# Patient Record
Sex: MALE | Race: WHITE | NOT HISPANIC OR LATINO | Employment: UNEMPLOYED | URBAN - METROPOLITAN AREA
[De-identification: names, ages, dates, MRNs, and addresses within clinical notes are randomized per-mention and may not be internally consistent; named-entity substitution may affect disease eponyms.]

---

## 2017-01-18 DIAGNOSIS — G62.9 PERIPHERAL POLYNEUROPATHY: ICD-10-CM

## 2017-01-18 RX ORDER — GABAPENTIN 300 MG/1
600 CAPSULE ORAL 3 TIMES DAILY
Qty: 180 CAP | Refills: 0 | Status: SHIPPED | OUTPATIENT
Start: 2017-01-18 | End: 2017-03-20 | Stop reason: SDUPTHER

## 2017-01-18 NOTE — TELEPHONE ENCOUNTER
Was the patient seen in the last year in this department? Yes     Does patient have an active prescription for medications requested? No     Received Request Via: Pharmacy

## 2017-03-20 DIAGNOSIS — G62.9 PERIPHERAL POLYNEUROPATHY: ICD-10-CM

## 2017-03-20 RX ORDER — GABAPENTIN 300 MG/1
600 CAPSULE ORAL 3 TIMES DAILY
Qty: 540 CAP | Refills: 0 | Status: SHIPPED | OUTPATIENT
Start: 2017-03-20 | End: 2017-06-19 | Stop reason: SDUPTHER

## 2017-03-20 NOTE — TELEPHONE ENCOUNTER
Was the patient seen in the last year in this department? Yes     Does patient have an active prescription for medications requested? No     Received Request Via: Patient requesting 90 day supply

## 2017-03-27 ENCOUNTER — TELEPHONE (OUTPATIENT)
Dept: MEDICAL GROUP | Facility: LAB | Age: 62
End: 2017-03-27

## 2017-03-27 NOTE — TELEPHONE ENCOUNTER
Pt is concerned as he ran out of verapamil and is going to have to go through a few days without it until he receives his order. I did offer to refill a 30 day supply but he declined. His messages were quite confusing but he basically wanting to know if he will be ok not being on his meds until his order is received. Please advise

## 2017-03-27 NOTE — TELEPHONE ENCOUNTER
Spoke to pharmacy and called in 1 week RX. Patient was notified of this and is fine with paying the 4$ out of pocket

## 2017-04-04 RX ORDER — ALLOPURINOL 100 MG/1
TABLET ORAL
Qty: 180 TAB | Refills: 0 | Status: SHIPPED | OUTPATIENT
Start: 2017-04-04 | End: 2017-07-02 | Stop reason: SDUPTHER

## 2017-06-05 ENCOUNTER — OFFICE VISIT (OUTPATIENT)
Dept: MEDICAL GROUP | Facility: LAB | Age: 62
End: 2017-06-05
Payer: COMMERCIAL

## 2017-06-05 VITALS
RESPIRATION RATE: 16 BRPM | OXYGEN SATURATION: 96 % | HEART RATE: 84 BPM | SYSTOLIC BLOOD PRESSURE: 118 MMHG | HEIGHT: 74 IN | DIASTOLIC BLOOD PRESSURE: 76 MMHG | BODY MASS INDEX: 30.54 KG/M2 | WEIGHT: 238 LBS | TEMPERATURE: 98 F

## 2017-06-05 DIAGNOSIS — I15.9 SECONDARY HYPERTENSION: ICD-10-CM

## 2017-06-05 DIAGNOSIS — H93.13 BILATERAL TINNITUS: ICD-10-CM

## 2017-06-05 PROCEDURE — 99214 OFFICE O/P EST MOD 30 MIN: CPT | Performed by: FAMILY MEDICINE

## 2017-06-05 ASSESSMENT — ENCOUNTER SYMPTOMS: SHORTNESS OF BREATH: 0

## 2017-06-05 NOTE — PROGRESS NOTES
Subjective:      Qamar Pride Jr. is a 62 y.o. male who presents with Other    Chief Complaint   Patient presents with   • Other     ringing in the ears             HPI    Bilateral tinnitus   This is a new problem to discuss today. Patient reports that he's had this since his neck surgery 3 years ago. He states that this is not getting worse but sometimes it's louder than at other times. He has not seen a specialist for this issue, specifically he has not seen an ENT physician. Has not tried anything for this. Reports that his hearing is pretty good.     [facility]     No CP, no SOB     Patient Active Problem List    Diagnosis Date Noted   • Elevated alkaline phosphatase level 07/21/2016   • Elevated serum creatinine 07/21/2016   • Elevated uric acid in blood 07/21/2016   • Elevated WBC count 07/21/2016   • Spondylosis of cervical spine with myelopathy and radiculopathy 01/27/2016   • Polyarthralgia 11/10/2015   • Hyperlipidemia 11/10/2015   • Right hip pain 11/10/2015   • Cyst of buttocks 09/14/2015   • Other affections of shoulder region, not elsewhere classified 06/04/2015   • Peripheral neuropathy 06/13/2014   • Cirrhosis of liver (CMS-HCC) 04/17/2014   • Rosacea 07/29/2013   • Renal disorder    • Arthritis    • Chronic low back pain    • Chronic insomnia    • Hypertension   this is a chronic problem. Patient is taking his medication as directed         Family History   Problem Relation Age of Onset   • Arthritis Mother    • Hyperlipidemia Mother    • Cancer Father      leukemia   • Diabetes Father    • Hyperlipidemia Father    • Hypertension Father    • Hypertension Brother         Social History     Social History   • Marital Status:      Spouse Name: N/A   • Number of Children: N/A   • Years of Education: N/A     Occupational History   • Not on file.     Social History Main Topics   • Smoking status: Former Smoker   • Smokeless tobacco:  "Never Used      Comment: quit 10 years ago   • Alcohol Use: Yes   • Drug Use: No   • Sexual Activity: Not on file     Other Topics Concern   • Not on file     Social History Narrative    ** Merged History Encounter **            Current outpatient prescriptions:   •  allopurinol (ZYLOPRIM) 100 MG Tab, TAKE 2 TABLETS DAILY, Disp: 180 Tab, Rfl: 0  •  verapamil ER (CALAN-SR) 180 MG Tab CR, TAKE 1 TABLET DAILY, Disp: 90 Tab, Rfl: 1  •  gabapentin (NEURONTIN) 300 MG Cap, Take 2 Caps by mouth 3 times a day., Disp: 540 Cap, Rfl: 0  •  metronidazole (METROGEL) 1 % gel, Apply to affected area 2 times a day or as needed.  Indications: Acne of Unknown cause Usually in Older Age Groups, Disp: 60 g, Rfl: 1  •  spironolactone (ALDACTONE) 50 MG Tab, Take 1 Tab by mouth 2 times a day., Disp: 180 Tab, Rfl: 0  •  therapeutic multivitamin-minerals (THERAGRAN-M) TABS, 1 Tab by Nasogastric route every day., Disp: , Rfl:       Review of Systems   HENT: Positive for tinnitus. Negative for hearing loss.    Respiratory: Negative for shortness of breath.    Cardiovascular: Negative for chest pain.         Objective:     /76 mmHg  Pulse 84  Temp(Src) 36.7 °C (98 °F)  Resp 16  Ht 1.88 m (6' 2.02\")  Wt 107.956 kg (238 lb)  BMI 30.54 kg/m2  SpO2 96%     Physical Exam   Constitutional: He appears well-developed and well-nourished. He is active and cooperative.  Non-toxic appearance. No distress.   HENT:   Head: Normocephalic and atraumatic.   Right Ear: Tympanic membrane normal.   Left Ear: Tympanic membrane normal.   Eyes: Conjunctivae and EOM are normal.   Cardiovascular: Normal rate and regular rhythm.    Pulmonary/Chest: Effort normal. No tachypnea. No respiratory distress. He has decreased breath sounds. He has no wheezes. He has no rhonchi. He has no rales.   Neurological: He is alert. He is not disoriented. He displays no tremor. He displays no seizure activity.   Skin: Skin is warm and dry. He is not diaphoretic. "   Psychiatric: He has a normal mood and affect. His speech is normal and behavior is normal.               Assessment/Plan:     1. Bilateral tinnitus  Discussed with patient. Discussed with him that this can often be difficult to treat if treatable at all. I would however like him to see an ENT physician for further evaluation. He is in agreement.  - REFERRAL TO ENT    2. Secondary hypertension  Controlled. No change in medication    We will attempt to get labs from [facility]     Please note that this dictation was created using voice recognition software. I have made every reasonable attempt to correct obvious errors, but I expect that there are errors of grammar and possibly content that I did not discover before finalizing the note.

## 2017-06-05 NOTE — MR AVS SNAPSHOT
"        Qamar Lenny Pride JrGrzegorz   2017 3:00 PM   Office Visit   MRN: [id number]    Department:  [facility]   Dept Phone:  [phone]    Description:  Male : 1955   Provider:  Lee Ann Craft M.D.           Reason for Visit     Other ringing in the ears      Allergies as of 2017     Allergen Noted Reactions    Tramadol 2014       \"makes me crazy\", states highly allergic     Diuretic [Buchu-Cornsilk-Ch Grass-Hydran] 2011       Diana [Amlodipine-Olmesartan] 2011       Patient hospitalized post administration for uncontrolled blood pressure    Benicar [Olmesartan Medoxomil] 2011       Again hospitalized post administration for uncontrolled blood pressure and chest pain    Sulfa Drugs 2009   Nausea    Sulfa Drugs 2014       Tramadol 2014       Hallucination      Vicodin [Hydrocodone-Acetaminophen] 2009   Vomiting      You were diagnosed with     Bilateral tinnitus   [[id number]]         Vital Signs     Blood Pressure Pulse Temperature Respirations Height Weight    118/76 mmHg 84 36.7 °C (98 °F) 16 1.88 m (6' 2.02\") 107.956 kg (238 lb)    Body Mass Index Oxygen Saturation Smoking Status             30.54 kg/m2 96% Former Smoker         Basic Information     Date Of Birth Sex Race Ethnicity Preferred Language    1955 Male White Non- English      Problem List              ICD-10-CM Priority Class Noted - Resolved    Renal disorder N28.9   Unknown - Present    Arthritis M19.90   Unknown - Present    Chronic low back pain M54.5, G89.29   Unknown - Present    Chronic insomnia F51.04   Unknown - Present    Hypertension I10   Unknown - Present    Rosacea L71.9   2013 - Present    Cirrhosis of liver (CMS-HCC) K74.60   2014 - Present    Peripheral neuropathy G62.9   2014 - Present    Other affections of shoulder region, not elsewhere classified M75.80   2015 - Present    Cyst of buttocks L72.9   2015 - Present    Polyarthralgia " M25.50   11/10/2015 - Present    Hyperlipidemia E78.5   11/10/2015 - Present    Right hip pain M25.551   11/10/2015 - Present    Spondylosis of cervical spine with myelopathy and radiculopathy M47.12, M47.22   1/27/2016 - Present    Elevated alkaline phosphatase level R74.8   7/21/2016 - Present    Elevated serum creatinine R79.89   7/21/2016 - Present    Elevated uric acid in blood E79.0   7/21/2016 - Present    Elevated WBC count D72.829   7/21/2016 - Present      Health Maintenance        Date Due Completion Dates    IMM DTaP/Tdap/Td Vaccine (1 - Tdap) 4/26/1975 ---    PSA Screening 12/3/2012 12/3/2011    IMM ZOSTER VACCINE 4/26/2016 ---    COLONOSCOPY 3/28/2024 3/28/2014, 3/28/2014, 1/1/2006 (Prv Comp)    Override on 1/1/2006: Previously completed (completed at [facility])            Current Immunizations     13-VALENT PCV PREVNAR 1/6/2015  2:04 PM    Influenza Vaccine Quad Inj (Preserved) 10/11/2016    Pneumococcal polysaccharide vaccine (PPSV-23) 3/14/2014      Below and/or attached are the medications your provider expects you to take. Review all of your home medications and newly ordered medications with your provider and/or pharmacist. Follow medication instructions as directed by your provider and/or pharmacist. Please keep your medication list with you and share with your provider. Update the information when medications are discontinued, doses are changed, or new medications (including over-the-counter products) are added; and carry medication information at all times in the event of emergency situations     Allergies:  TRAMADOL - (reactions not documented)     DIURETIC - (reactions not documented)     VARINDER - (reactions not documented)     BENICAR - (reactions not documented)     SULFA DRUGS - Nausea     SULFA DRUGS - (reactions not documented)     TRAMADOL - (reactions not documented)     VICODIN - Vomiting               Medications  Valid as of: June 05, 2017 -  3:32 PM    Generic Name Brand Name Tablet  Size Instructions for use    Allopurinol (Tab) ZYLOPRIM 100 MG TAKE 2 TABLETS DAILY        Gabapentin (Cap) NEURONTIN 300 MG Take 2 Caps by mouth 3 times a day.        LORazepam (Tab) ATIVAN 1 MG Take 1 Tab by mouth every 8 hours as needed for Anxiety.        MetroNIDAZOLE (Gel) METROGEL 1 % Apply to affected area 2 times a day or as needed.  Indications: Acne of Unknown cause Usually in Older Age Groups        Multiple Vitamins-Minerals (Tab) THERAGRAN-M  1 Tab by Nasogastric route every day.        Spironolactone (Tab) ALDACTONE 50 MG Take 1 Tab by mouth 2 times a day.        Verapamil HCl (Tab CR) CALAN- MG TAKE 1 TABLET DAILY        .                 Medicines prescribed today were sent to:     [facility] [address] [place], [place] [address] [address] [address]    Phone: [phone] Fax: [phone]    Open 24 Hours?: No    [facility] - [address] [address]    Phone: [phone] Fax: [phone]    Open 24 Hours?: No      Medication refill instructions:       If your prescription bottle indicates you have medication refills left, it is not necessary to call your provider’s office. Please contact your pharmacy and they will refill your medication.    If your prescription bottle indicates you do not have any refills left, you may request refills at any time through one of the following ways: The online [employer] system (except Urgent Care), by calling your provider’s office, or by asking your pharmacy to contact your provider’s office with a refill request. Medication refills are processed only during regular business hours and may not be available until the next business day. Your provider may request additional information or to have a follow-up visit with you prior to refilling your medication.   *Please Note: Medication refills are assigned a new Rx number when refilled electronically.  Your pharmacy may indicate that no refills were authorized even though a new prescription for the same medication is available at the pharmacy. Please request the medicine by name with the pharmacy before contacting your provider for a refill.        Referral     A referral request has been sent to our patient care coordination department. Please allow 3-5 business days for us to process this request and contact you either by phone or mail. If you do not hear from us by the 5th business day, please call us at [phone].           TNC Access Code: Activation code not generated  Current TNC Status: Active

## 2017-06-07 ENCOUNTER — TELEPHONE (OUTPATIENT)
Dept: MEDICAL GROUP | Facility: LAB | Age: 62
End: 2017-06-07

## 2017-06-07 NOTE — TELEPHONE ENCOUNTER
----- Message from Lee Ann Craft M.D. sent at 6/5/2017  5:34 PM PDT -----  Labs from [facility] please

## 2017-06-20 RX ORDER — GABAPENTIN 300 MG/1
CAPSULE ORAL
Qty: 540 CAP | Refills: 1 | Status: SHIPPED | OUTPATIENT
Start: 2017-06-20

## 2017-07-03 RX ORDER — ALLOPURINOL 100 MG/1
TABLET ORAL
Qty: 180 TAB | Refills: 0 | Status: SHIPPED | OUTPATIENT
Start: 2017-07-03 | End: 2017-08-08 | Stop reason: SDUPTHER

## 2017-07-03 NOTE — TELEPHONE ENCOUNTER
Was the patient seen in the last year in this department? Yes     Does patient have an active prescription for medications requested? No     Received Request Via: Pharmacy     Last visit:6/5/17

## 2017-08-08 RX ORDER — ALLOPURINOL 100 MG/1
200 TABLET ORAL
Qty: 180 TAB | Refills: 0 | Status: SHIPPED | OUTPATIENT
Start: 2017-08-08

## 2017-10-01 NOTE — TELEPHONE ENCOUNTER
Was the patient seen in the last year in this department? Yes Lov 6/5/17    Does patient have an active prescription for medications requested? No     Received Request Via: Pharmacy

## 2017-10-02 RX ORDER — ALLOPURINOL 100 MG/1
TABLET ORAL
Qty: 180 TAB | Refills: 0 | Status: SHIPPED | OUTPATIENT
Start: 2017-10-02

## 2019-01-11 ENCOUNTER — TELEPHONE (OUTPATIENT)
Dept: TRANSPLANT | Facility: MEDICAL CENTER | Age: 64
End: 2019-01-11

## 2019-01-11 DIAGNOSIS — K74.69 OTHER CIRRHOSIS OF LIVER (HCC): Primary | ICD-10-CM

## 2019-01-11 DIAGNOSIS — K76.0 FATTY (CHANGE OF) LIVER, NOT ELSEWHERE CLASSIFIED: ICD-10-CM

## 2019-01-11 NOTE — TELEPHONE ENCOUNTER
LM for Brian to inform him that [facility] had received confirmation that [facility] has agreed to pay in network rates to [facility] to be evaluated for liver transplant with [facility]. Awaiting call back to confirm that patient would like to be seen at [facility].

## 2019-01-14 DIAGNOSIS — K74.60 CIRRHOSIS OF LIVER WITH ASCITES, UNSPECIFIED HEPATIC CIRRHOSIS TYPE (HCC): ICD-10-CM

## 2019-01-14 DIAGNOSIS — R18.8 CIRRHOSIS OF LIVER WITH ASCITES, UNSPECIFIED HEPATIC CIRRHOSIS TYPE (HCC): ICD-10-CM

## 2019-02-12 ENCOUNTER — TELEPHONE (OUTPATIENT)
Dept: TRANSPLANT | Facility: MEDICAL CENTER | Age: 64
End: 2019-02-12

## 2019-02-13 NOTE — TELEPHONE ENCOUNTER
Called and Spoke to Qamar. Informed him that we have not been able to get approval from [facility] to continue OLTE at [facility] and that he will have to go to [facility] for workup. Pt should receive call from liver transplant team to set up an appointment. Pt verbalized understanding.